# Patient Record
Sex: FEMALE | Race: WHITE | NOT HISPANIC OR LATINO | URBAN - METROPOLITAN AREA
[De-identification: names, ages, dates, MRNs, and addresses within clinical notes are randomized per-mention and may not be internally consistent; named-entity substitution may affect disease eponyms.]

---

## 2020-10-06 ENCOUNTER — POST-OPERATIVE VISIT (OUTPATIENT)
Dept: URBAN - METROPOLITAN AREA CLINIC 68 | Facility: CLINIC | Age: 85
End: 2020-10-06
Payer: MEDICARE

## 2020-10-06 PROCEDURE — 67028 INJECTION EYE DRUG: CPT | Performed by: OPHTHALMOLOGY

## 2020-10-06 ASSESSMENT — INTRAOCULAR PRESSURE
OS: 8
OD: 7

## 2020-11-03 ENCOUNTER — OFFICE VISIT (OUTPATIENT)
Dept: URBAN - METROPOLITAN AREA CLINIC 68 | Facility: CLINIC | Age: 85
End: 2020-11-03
Payer: MEDICARE

## 2020-11-03 PROCEDURE — 92014 COMPRE OPH EXAM EST PT 1/>: CPT | Performed by: OPHTHALMOLOGY

## 2020-11-03 PROCEDURE — 92134 CPTRZ OPH DX IMG PST SGM RTA: CPT | Performed by: OPHTHALMOLOGY

## 2020-11-03 PROCEDURE — 67028 INJECTION EYE DRUG: CPT | Performed by: OPHTHALMOLOGY

## 2020-11-03 ASSESSMENT — INTRAOCULAR PRESSURE
OS: 8
OD: 7

## 2020-11-03 NOTE — IMPRESSION/PLAN
Impression: Macular degeneration, wet OD. Status: Symptomatic.
h/o Sterile inflammation s/p Lucentis x 56 last 3/20/18
s/p Avastin x 66 last 10/6/2020 (consented 12/10/19) Plan: The patient notes continued distortion. Exam and OCT reveal massive SRH and a PED OD. An IVFA from 09/26/17 demonstrated staining. Fundus Photos from 09/26/17 demonstrated atrophy. Recommend Avastin. R/B/A discussed with patient. The risks of Avastin were discussed, including off-label use and compounding pharmacy risks, and the patient elects to proceed with Avastin. After 2% Subconjunctival anesthesia & betadine prep, 1.25mg of Avastin was injected in the eye. Cold compress and Tylenol suggested for pain if needed. Thanks, Saroj Nowak Return in 1 month for re-eval with OCT OU

## 2020-11-03 NOTE — IMPRESSION/PLAN
Impression: Macular degeneration, regressed OS. Status: Chronic
s/p Avastin x 20 last 01/08/13
s/p Lucentis x 4 last 10/13/15 Plan: Pt notes distortion OS. Exam and OCT reveal a PED OS. An IVFA from 09/26/17 demonstrated staining. Fundus Photos from 09/26/17 demonstrated pigmentation. Observe.

## 2020-11-03 NOTE — IMPRESSION/PLAN
Impression: Ocular hypertension, OU. Status: Controlled. C/D 0.3, OU Plan: Monitor IOP. Coordinate care.

## 2020-11-13 ENCOUNTER — OFFICE VISIT (OUTPATIENT)
Dept: URBAN - METROPOLITAN AREA CLINIC 71 | Facility: CLINIC | Age: 85
End: 2020-11-13
Payer: MEDICARE

## 2020-11-13 DIAGNOSIS — H18.523 EPITHELIAL (JUVENILE) CORNEAL DYSTROPHY, BILATERAL: ICD-10-CM

## 2020-11-13 DIAGNOSIS — H35.3233: Primary | ICD-10-CM

## 2020-11-13 PROCEDURE — 92004 COMPRE OPH EXAM NEW PT 1/>: CPT | Performed by: OPHTHALMOLOGY

## 2020-11-13 RX ORDER — MELATONIN 3 MG
3 MG TABLET ORAL
Qty: 0 | Refills: 0 | Status: ACTIVE
Start: 2020-11-13

## 2020-11-13 RX ORDER — MIRTAZAPINE 15 MG/1
15 MG TABLET, FILM COATED ORAL
Qty: 0 | Refills: 0 | Status: ACTIVE
Start: 2020-11-13

## 2020-11-13 RX ORDER — SPIRONOLACTONE 25 MG/1
25 MG TABLET, FILM COATED ORAL
Qty: 0 | Refills: 0 | Status: ACTIVE
Start: 2020-11-13

## 2020-11-13 RX ORDER — LORATADINE 10 MG
10 MG TABLET ORAL
Qty: 0 | Refills: 0 | Status: ACTIVE
Start: 2020-11-13

## 2020-11-13 RX ORDER — D-MANNOSE
POWDER (GRAM) ORAL
Qty: 500 | Refills: 0 | Status: ACTIVE
Start: 2020-11-13

## 2020-11-13 RX ORDER — LEVOTHYROXINE SODIUM 50 UG/1
TABLET ORAL
Qty: 0 | Refills: 0 | Status: ACTIVE
Start: 2020-11-13

## 2020-11-13 RX ORDER — LOSARTAN POTASSIUM 100 MG/1
100 MG TABLET ORAL
Qty: 0 | Refills: 0 | Status: ACTIVE
Start: 2020-11-13

## 2020-11-13 ASSESSMENT — INTRAOCULAR PRESSURE
OS: 6
OD: 6

## 2020-11-13 NOTE — IMPRESSION/PLAN
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 Plan: Discussed diagnosis with patient. Informed her that her vision is not likely to improve, but vision should  not decrease dramatically. There is no new blood seen today. Recommend patient to continue with appointments with retinal specialist, Dr. David Bal. Not recommending more injections at this time.

## 2020-11-13 NOTE — IMPRESSION/PLAN
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 Plan: ABMD is an abnormality of the maturation of the epithelial cells on the surface of the cornea, such that they don't adhere normally, and cause opacities and scarring to occur. ABMD may need to be treated if it causes ocular discomfort or affects the vision.

## 2020-12-01 ENCOUNTER — OFFICE VISIT (OUTPATIENT)
Dept: URBAN - METROPOLITAN AREA CLINIC 68 | Facility: CLINIC | Age: 85
End: 2020-12-01
Payer: MEDICARE

## 2020-12-01 PROCEDURE — 92014 COMPRE OPH EXAM EST PT 1/>: CPT | Performed by: OPHTHALMOLOGY

## 2020-12-01 PROCEDURE — 92134 CPTRZ OPH DX IMG PST SGM RTA: CPT | Performed by: OPHTHALMOLOGY

## 2020-12-01 PROCEDURE — 67028 INJECTION EYE DRUG: CPT | Performed by: OPHTHALMOLOGY

## 2020-12-01 ASSESSMENT — INTRAOCULAR PRESSURE
OD: 8
OS: 9

## 2020-12-01 NOTE — IMPRESSION/PLAN
Impression: Macular degeneration, wet OD. Status: Symptomatic.
h/o Sterile inflammation s/p Lucentis x 56 last 3/20/18
s/p Avastin x 67 last 11/3/2020 (consented 12/10/19) Plan: Exam and OCT reveal massive SRH and a PED OD. An IVFA from 09/26/17 demonstrated staining. Fundus Photos from 09/26/17 demonstrated atrophy. Recommend Avastin. R/B/A discussed with patient. The risks of Avastin were discussed, including off-label use and compounding pharmacy risks, and the patient elects to proceed with Avastin. After 2% Subconjunctival anesthesia & betadine prep, 1.25mg of Avastin was injected in the eye. Cold compress and Tylenol suggested for pain if needed. Thanks, Judith He Return in 1 month for re-eval with OCT OU

## 2021-01-12 ENCOUNTER — OFFICE VISIT (OUTPATIENT)
Dept: URBAN - METROPOLITAN AREA CLINIC 68 | Facility: CLINIC | Age: 86
End: 2021-01-12
Payer: MEDICARE

## 2021-01-12 PROCEDURE — 67028 INJECTION EYE DRUG: CPT | Performed by: OPHTHALMOLOGY

## 2021-01-12 PROCEDURE — 92134 CPTRZ OPH DX IMG PST SGM RTA: CPT | Performed by: OPHTHALMOLOGY

## 2021-01-12 PROCEDURE — 92014 COMPRE OPH EXAM EST PT 1/>: CPT | Performed by: OPHTHALMOLOGY

## 2021-01-12 ASSESSMENT — INTRAOCULAR PRESSURE
OD: 10
OS: 12

## 2021-01-12 NOTE — IMPRESSION/PLAN
Impression: Macular degeneration, wet OD. Status: Symptomatic.
h/o Sterile inflammation s/p Lucentis x 56 last 3/20/18
s/p Avastin x 68 last 12/1/2020 (consented 12/10/19) Plan: Exam and OCT reveal massive SRH and a PED OD. An IVFA from 09/26/17 demonstrated staining. Fundus Photos from 09/26/17 demonstrated atrophy. Recommend Avastin. R/B/A discussed with patient. The risks of Avastin were discussed, including off-label use and compounding pharmacy risks, and the patient elects to proceed with Avastin. After 2% Subconjunctival anesthesia & betadine prep, 1.25mg of Avastin was injected in the eye. Cold compress and Tylenol suggested for pain if needed. Thanks, Pipo Ernst Return in 1 month for re-eval with OCT OU, possible Lucentis (authorization)

## 2021-02-23 ENCOUNTER — OFFICE VISIT (OUTPATIENT)
Dept: URBAN - METROPOLITAN AREA CLINIC 68 | Facility: CLINIC | Age: 86
End: 2021-02-23
Payer: MEDICARE

## 2021-02-23 DIAGNOSIS — H43.813 VITREOUS DEGENERATION, BILATERAL: ICD-10-CM

## 2021-02-23 DIAGNOSIS — H04.123 DRY EYE SYNDROME OF BILATERAL LACRIMAL GLANDS: ICD-10-CM

## 2021-02-23 DIAGNOSIS — H35.3222 EXDTVE AGE-REL MCLR DEGN, LEFT EYE, WITH INACT CHRDL NEOVAS: ICD-10-CM

## 2021-02-23 PROCEDURE — 67028 INJECTION EYE DRUG: CPT | Performed by: OPHTHALMOLOGY

## 2021-02-23 PROCEDURE — 92134 CPTRZ OPH DX IMG PST SGM RTA: CPT | Performed by: OPHTHALMOLOGY

## 2021-02-23 PROCEDURE — 92014 COMPRE OPH EXAM EST PT 1/>: CPT | Performed by: OPHTHALMOLOGY

## 2021-02-23 ASSESSMENT — INTRAOCULAR PRESSURE
OD: 10
OS: 13

## 2021-02-23 NOTE — IMPRESSION/PLAN
Impression: Macular degeneration, wet OD. Status: Symptomatic.
h/o Sterile inflammation s/p Lucentis x 56 last 3/20/18
s/p Avastin x 69  last 1/12/2021 (consented 12/10/19) Plan: Exam and OCT reveal a PED OD. An IVFA from 09/26/17 demonstrated staining. Fundus Photos from 09/26/17 demonstrated atrophy. Recommend Avastin. R/B/A discussed with patient. The risks of Avastin were discussed, including off-label use and compounding pharmacy risks, and the patient elects to proceed with Avastin. After 2% Subconjunctival anesthesia & betadine prep, 1.25mg of Avastin was injected in the eye. Cold compress and Tylenol suggested for pain if needed. Thanks, Bess Vargas Return in 1 month for re-eval with OCT OU, possible Lucentis (authorization)

## 2021-04-06 ENCOUNTER — OFFICE VISIT (OUTPATIENT)
Dept: URBAN - METROPOLITAN AREA CLINIC 68 | Facility: CLINIC | Age: 86
End: 2021-04-06
Payer: MEDICARE

## 2021-04-06 PROCEDURE — 92014 COMPRE OPH EXAM EST PT 1/>: CPT | Performed by: OPHTHALMOLOGY

## 2021-04-06 PROCEDURE — 67028 INJECTION EYE DRUG: CPT | Performed by: OPHTHALMOLOGY

## 2021-04-06 ASSESSMENT — INTRAOCULAR PRESSURE
OS: 12
OD: 10

## 2021-04-06 NOTE — IMPRESSION/PLAN
Impression: Macular degeneration, wet OD. Status: Symptomatic.
h/o Sterile inflammation s/p Lucentis x 56 last 3/20/18
s/p Avastin x 70 last 02/23/2021 (consented 12/10/19) Plan: The patient notes worsening. Exam and OCT reveal a PED OD. An IVFA from 09/26/17 demonstrated staining. Fundus Photos from 09/26/17 demonstrated atrophy. Recommend Lucentis. R/B/A discussed with patient. Patient elects to proceed with Lucentis 0.5mg today. After 2% Subconjunctival anesthesia & betadine prep, Lucentis was injected in the eye with no complications. Remainder discarded. Cold compress and Tylenol suggested for pain if needed. 

Return in 1 month for re-eval with OCT OU, possible Lucentis

## 2021-05-04 ENCOUNTER — OFFICE VISIT (OUTPATIENT)
Dept: URBAN - METROPOLITAN AREA CLINIC 68 | Facility: CLINIC | Age: 86
End: 2021-05-04
Payer: MEDICARE

## 2021-05-04 PROCEDURE — 92134 CPTRZ OPH DX IMG PST SGM RTA: CPT | Performed by: OPHTHALMOLOGY

## 2021-05-04 PROCEDURE — 67028 INJECTION EYE DRUG: CPT | Performed by: OPHTHALMOLOGY

## 2021-05-04 PROCEDURE — 99214 OFFICE O/P EST MOD 30 MIN: CPT | Performed by: OPHTHALMOLOGY

## 2021-05-04 ASSESSMENT — INTRAOCULAR PRESSURE
OS: 8
OD: 9

## 2021-05-04 NOTE — IMPRESSION/PLAN
Impression: Macular degeneration, wet OD. Status: Symptomatic.
h/o Sterile inflammation s/p Lucentis x 57 last 04/06/2021
s/p Avastin x 70 last 02/23/2021 (consented 12/10/19) Plan: Exam and OCT reveal a PED OD. An IVFA from 09/26/17 demonstrated staining. Fundus Photos from 09/26/17 demonstrated atrophy. Recommend Lucentis. R/B/A discussed with patient. Patient elects to proceed with Lucentis 0.5mg today. After 2% Subconjunctival anesthesia & betadine prep, Lucentis was injected in the eye with no complications. Remainder discarded. Cold compress and Tylenol suggested for pain if needed. 

Return in 1 month for re-eval with OCT OU, possible Lucentis

## 2021-06-01 ENCOUNTER — OFFICE VISIT (OUTPATIENT)
Dept: URBAN - METROPOLITAN AREA CLINIC 68 | Facility: CLINIC | Age: 86
End: 2021-06-01
Payer: MEDICARE

## 2021-06-01 DIAGNOSIS — Z96.1 PRESENCE OF INTRAOCULAR LENS: ICD-10-CM

## 2021-06-01 PROCEDURE — 99214 OFFICE O/P EST MOD 30 MIN: CPT | Performed by: OPHTHALMOLOGY

## 2021-06-01 PROCEDURE — 67028 INJECTION EYE DRUG: CPT | Performed by: OPHTHALMOLOGY

## 2021-06-01 PROCEDURE — 92134 CPTRZ OPH DX IMG PST SGM RTA: CPT | Performed by: OPHTHALMOLOGY

## 2021-06-01 ASSESSMENT — INTRAOCULAR PRESSURE
OD: 10
OS: 12

## 2021-06-01 NOTE — IMPRESSION/PLAN
Impression: Macular degeneration, wet OD. Status: Symptomatic.
h/o Sterile inflammation s/p Lucentis x 58 last 05/04/2021
s/p Avastin x 70 last 02/23/2021 (consented 12/10/19) Plan: Exam and OCT reveal a PED OD. An IVFA from 09/26/17 demonstrated staining. Fundus Photos from 09/26/17 demonstrated atrophy. Recommend Lucentis. R/B/A discussed with patient. Patient elects to proceed with Lucentis 0.5mg today. After 2% Subconjunctival anesthesia & betadine prep, Lucentis was injected in the eye with no complications. Remainder discarded. Cold compress and Tylenol suggested for pain if needed. 

Return in 8 weeks for re-eval with OCT OU, possible Lucentis

## 2021-07-27 ENCOUNTER — OFFICE VISIT (OUTPATIENT)
Dept: URBAN - METROPOLITAN AREA CLINIC 68 | Facility: CLINIC | Age: 86
End: 2021-07-27
Payer: MEDICARE

## 2021-07-27 PROCEDURE — 92134 CPTRZ OPH DX IMG PST SGM RTA: CPT | Performed by: OPHTHALMOLOGY

## 2021-07-27 PROCEDURE — 67028 INJECTION EYE DRUG: CPT | Performed by: OPHTHALMOLOGY

## 2021-07-27 PROCEDURE — 99214 OFFICE O/P EST MOD 30 MIN: CPT | Performed by: OPHTHALMOLOGY

## 2021-07-27 ASSESSMENT — INTRAOCULAR PRESSURE
OD: 9
OS: 10

## 2021-07-27 NOTE — IMPRESSION/PLAN
Impression: Macular degeneration, wet OD. Status: Symptomatic.
h/o Sterile inflammation s/p Lucentis x 58 last 05/04/2021
s/p Avastin x 70 last 02/23/2021 (consented 12/10/19) Plan: The patient notes blurring. Exam and OCT reveal a PED OD. An IVFA from 09/26/17 demonstrated staining. Fundus Photos from 09/26/17 demonstrated atrophy. Recommend Lucentis. R/B/A discussed with patient. Patient elects to proceed with Lucentis 0.5mg today. After 2% Subconjunctival anesthesia & betadine prep, Lucentis was injected in the eye with no complications. Remainder discarded. Cold compress and Tylenol suggested for pain if needed. 

Return in 8 weeks for re-eval with OCT OU, possible Lucentis

## 2021-09-21 ENCOUNTER — OFFICE VISIT (OUTPATIENT)
Dept: URBAN - METROPOLITAN AREA CLINIC 68 | Facility: CLINIC | Age: 86
End: 2021-09-21
Payer: MEDICARE

## 2021-09-21 PROCEDURE — 92014 COMPRE OPH EXAM EST PT 1/>: CPT | Performed by: OPHTHALMOLOGY

## 2021-09-21 PROCEDURE — 67028 INJECTION EYE DRUG: CPT | Performed by: OPHTHALMOLOGY

## 2021-09-21 PROCEDURE — 92134 CPTRZ OPH DX IMG PST SGM RTA: CPT | Performed by: OPHTHALMOLOGY

## 2021-09-21 ASSESSMENT — INTRAOCULAR PRESSURE
OS: 9
OD: 9

## 2021-09-21 NOTE — IMPRESSION/PLAN
Impression: Macular degeneration, wet OD. Status: Symptomatic.
h/o Sterile inflammation s/p Lucentis x 59 last 07/27/2021 
s/p Avastin x 70 last 02/23/2021  Plan: The patient notes blurring. Exam and OCT reveal a PED OD. An IVFA from 09/26/17 demonstrated staining. Fundus Photos from 09/26/17 demonstrated atrophy. Recommend Lucentis. R/B/A discussed with patient. Patient elects to proceed with Lucentis 0.5mg today. After 2% Subconjunctival anesthesia & betadine prep, Lucentis was injected in the eye with no complications. Remainder discarded. Cold compress and Tylenol suggested for pain if needed. Will consider Avastin Return in 8 weeks for re-eval with OCT OU, possible Avastin Daniela Chavez

## 2021-11-02 ENCOUNTER — OFFICE VISIT (OUTPATIENT)
Dept: URBAN - METROPOLITAN AREA CLINIC 68 | Facility: CLINIC | Age: 86
End: 2021-11-02
Payer: MEDICARE

## 2021-11-02 PROCEDURE — 67028 INJECTION EYE DRUG: CPT | Performed by: OPHTHALMOLOGY

## 2021-11-02 PROCEDURE — 92014 COMPRE OPH EXAM EST PT 1/>: CPT | Performed by: OPHTHALMOLOGY

## 2021-11-02 ASSESSMENT — INTRAOCULAR PRESSURE
OD: 8
OS: 10

## 2021-11-02 NOTE — IMPRESSION/PLAN
Impression: Macular degeneration, wet OD. Status: Symptomatic.
h/o Sterile inflammation s/p Lucentis x 60 last 09/21/2021 
s/p Avastin x 70 last 02/23/2021 Plan: The patient notes continued blurring. Exam and OCT reveal a PED OD. An IVFA from 09/26/17 demonstrated staining. Fundus Photos from 09/26/17 demonstrated atrophy. Recommend Avastin. R/B/A discussed with patient. The risks of Avastin were discussed, including off-label use and compounding pharmacy risks, and the patient elects to proceed with Avastin. After 2% Subconjunctival anesthesia & betadine prep, 1.25mg of Avastin was injected in the eye. Cold compress and Tylenol suggested for pain if needed. 

Return in 8 weeks for re-eval with OCT OU, possible Avastin

## 2022-01-25 ENCOUNTER — OFFICE VISIT (OUTPATIENT)
Dept: URBAN - METROPOLITAN AREA CLINIC 68 | Facility: CLINIC | Age: 87
End: 2022-01-25
Payer: MEDICARE

## 2022-01-25 PROCEDURE — 99214 OFFICE O/P EST MOD 30 MIN: CPT | Performed by: OPHTHALMOLOGY

## 2022-01-25 PROCEDURE — 67028 INJECTION EYE DRUG: CPT | Performed by: OPHTHALMOLOGY

## 2022-01-25 PROCEDURE — 92134 CPTRZ OPH DX IMG PST SGM RTA: CPT | Performed by: OPHTHALMOLOGY

## 2022-01-25 ASSESSMENT — INTRAOCULAR PRESSURE
OS: 12
OD: 9

## 2022-01-25 NOTE — IMPRESSION/PLAN
Impression: Macular degeneration, wet OD. Status: Symptomatic.
h/o Sterile inflammation s/p Lucentis x 60 last 09/21/2021 
s/p Avastin x 71  last 11/02/2021  Plan: Exam and OCT reveal a PED OD. An IVFA from 09/26/17 demonstrated staining. Fundus Photos from 09/26/17 demonstrated atrophy. Recommend Avastin. R/B/A discussed with patient. The risks of Avastin were discussed, including off-label use and compounding pharmacy risks, and the patient elects to proceed with Avastin. After 2% Subconjunctival anesthesia & betadine prep, 1.25mg of Avastin was injected in the eye. Cold compress and Tylenol suggested for pain if needed. 

Return in 8 weeks for re-eval with OCT OU, possible Avastin

## 2022-03-08 ENCOUNTER — OFFICE VISIT (OUTPATIENT)
Dept: URBAN - METROPOLITAN AREA CLINIC 68 | Facility: CLINIC | Age: 87
End: 2022-03-08
Payer: MEDICARE

## 2022-03-08 DIAGNOSIS — H35.3211 EXDTVE AGE-REL MCLR DEGN, RIGHT EYE, WITH ACTV CHRDL NEOVAS: Primary | ICD-10-CM

## 2022-03-08 DIAGNOSIS — H01.004 UNSPECIFIED BLEPHARITIS LEFT UPPER EYELID: ICD-10-CM

## 2022-03-08 DIAGNOSIS — H40.053 OCULAR HYPERTENSION, BILATERAL: ICD-10-CM

## 2022-03-08 PROCEDURE — 67028 INJECTION EYE DRUG: CPT | Performed by: OPHTHALMOLOGY

## 2022-03-08 PROCEDURE — 92014 COMPRE OPH EXAM EST PT 1/>: CPT | Performed by: OPHTHALMOLOGY

## 2022-03-08 PROCEDURE — 92134 CPTRZ OPH DX IMG PST SGM RTA: CPT | Performed by: OPHTHALMOLOGY

## 2022-03-08 ASSESSMENT — INTRAOCULAR PRESSURE
OS: 10
OD: 9

## 2022-03-08 NOTE — IMPRESSION/PLAN
Impression: Macular degeneration, wet OD. Status: Symptomatic.
h/o Sterile inflammation s/p Lucentis x 60 last 09/21/2021 
s/p Avastin x 72  last 01/25/2022 Plan: The patient notes blurring. Exam and OCT reveal a PED OD. An IVFA from 09/26/17 demonstrated staining. Fundus Photos from 09/26/17 demonstrated atrophy. Recommend Avastin. R/B/A discussed with patient. The risks of Avastin were discussed, including off-label use and compounding pharmacy risks, and the patient elects to proceed with Avastin. After 2% Subconjunctival anesthesia & betadine prep, 1.25mg of Avastin was injected in the eye. Cold compress and Tylenol suggested for pain if needed. 

Return in 8 weeks for re-eval with OCT OU, possible Avastin

## 2022-04-19 ENCOUNTER — OFFICE VISIT (OUTPATIENT)
Dept: URBAN - METROPOLITAN AREA CLINIC 68 | Facility: CLINIC | Age: 87
End: 2022-04-19
Payer: MEDICARE

## 2022-04-19 DIAGNOSIS — H35.3222 EXDTVE AGE-REL MCLR DEGN, LEFT EYE, WITH INACT CHRDL NEOVAS: ICD-10-CM

## 2022-04-19 DIAGNOSIS — H43.813 VITREOUS DEGENERATION, BILATERAL: ICD-10-CM

## 2022-04-19 DIAGNOSIS — H40.053 OCULAR HYPERTENSION, BILATERAL: ICD-10-CM

## 2022-04-19 DIAGNOSIS — Z96.1 PRESENCE OF INTRAOCULAR LENS: ICD-10-CM

## 2022-04-19 DIAGNOSIS — H04.123 DRY EYE SYNDROME OF BILATERAL LACRIMAL GLANDS: ICD-10-CM

## 2022-04-19 DIAGNOSIS — H35.3211 EXDTVE AGE-REL MCLR DEGN, RIGHT EYE, WITH ACTV CHRDL NEOVAS: Primary | ICD-10-CM

## 2022-04-19 DIAGNOSIS — H01.004 UNSPECIFIED BLEPHARITIS LEFT UPPER EYELID: ICD-10-CM

## 2022-04-19 PROCEDURE — 92134 CPTRZ OPH DX IMG PST SGM RTA: CPT | Performed by: OPHTHALMOLOGY

## 2022-04-19 PROCEDURE — 92014 COMPRE OPH EXAM EST PT 1/>: CPT | Performed by: OPHTHALMOLOGY

## 2022-04-19 PROCEDURE — 67028 INJECTION EYE DRUG: CPT | Performed by: OPHTHALMOLOGY

## 2022-04-19 ASSESSMENT — INTRAOCULAR PRESSURE
OD: 11
OS: 12

## 2022-04-19 NOTE — IMPRESSION/PLAN
Impression: Macular degeneration, wet OD. Status: Symptomatic.
h/o Sterile inflammation s/p Lucentis x 60 last 09/21/2021 
s/p Avastin x 73  last 03/08/2022 Plan: The patient notes continued blurring. Exam and OCT reveal a PED OD. An IVFA from 09/26/17 demonstrated staining. Fundus Photos from 09/26/17 demonstrated atrophy. Recommend Avastin. R/B/A discussed with patient. The risks of Avastin were discussed, including off-label use and compounding pharmacy risks, and the patient elects to proceed with Avastin. After 2% Subconjunctival anesthesia & betadine prep, 1.25mg of Avastin was injected in the eye. Cold compress and Tylenol suggested for pain if needed. 

Return in 8 weeks for re-eval with OCT OU, possible Avastin